# Patient Record
Sex: MALE | Race: WHITE | NOT HISPANIC OR LATINO
[De-identification: names, ages, dates, MRNs, and addresses within clinical notes are randomized per-mention and may not be internally consistent; named-entity substitution may affect disease eponyms.]

---

## 2023-01-26 ENCOUNTER — APPOINTMENT (OUTPATIENT)
Dept: GASTROENTEROLOGY | Facility: CLINIC | Age: 43
End: 2023-01-26
Payer: COMMERCIAL

## 2023-01-26 VITALS
BODY MASS INDEX: 27.57 KG/M2 | DIASTOLIC BLOOD PRESSURE: 70 MMHG | OXYGEN SATURATION: 98 % | WEIGHT: 208 LBS | SYSTOLIC BLOOD PRESSURE: 112 MMHG | HEIGHT: 73 IN | HEART RATE: 82 BPM | TEMPERATURE: 97.5 F

## 2023-01-26 PROCEDURE — 99204 OFFICE O/P NEW MOD 45 MIN: CPT

## 2023-01-26 RX ORDER — FAMOTIDINE 20 MG/1
20 TABLET, FILM COATED ORAL
Refills: 0 | Status: ACTIVE | COMMUNITY

## 2023-01-26 NOTE — PHYSICAL EXAM
[Normal] : heart rate was normal and rhythm regular, normal S1 and S2, no murmurs [Bowel Sounds] : normal bowel sounds [Abdomen Tenderness] : non-tender [No Masses] : no abdominal mass palpated [Abdomen Soft] : soft [] : no hepatosplenomegaly

## 2023-01-26 NOTE — REVIEW OF SYSTEMS
[Fever] : no fever [Chills] : no chills [As Noted in HPI] : as noted in HPI [Negative] : Respiratory [FreeTextEntry7] : Occasional noncardiac chest discomfort.

## 2023-01-26 NOTE — HISTORY OF PRESENT ILLNESS
[FreeTextEntry1] : Saw patient Jerzy Pond in the office today.  The patient is a 42-year-old male who has no history of hypertension diabetes or coronary issues.  The patient has a remote history of anxiety and is currently on no medications.  Patient exercises regularly with no shortness of breath.  Intermittently the patient notices a fluttering sensation in his epigastric to esophageal area.  There is no necessary correlation with eating or fasting. athere  is no nausea ,vomiting,  dysphagia or unexplained weight loss.  Most recently the patient notices an escalation of the symptoms and also had noted some discomfort in the left side of his chest.  There was no associated shortness of breath or cardiac symptoms and in fact the patient had 2 cardiograms one in an urgent care and 1 in the emergency room.  Patient's cardiac enzymes were negative.  The patient states his bowel movements are normal with no blood in the stool or on the toilet tissue.  The patient may have 1 to 2 cups of caffeine a day rarely has ethanol and does not smoke.  The patient was recently started on omeprazole as well as famotidine in the evening.

## 2023-01-26 NOTE — ASSESSMENT
[FreeTextEntry1] : The patient is a 42-year-old male who has intermittent episodes of retrosternal chest discomfort.  Most recently there was an escalation which necessitated to 2 emergency  visits both of which had negative cardiac outcomes.  The differential diagnosis does include visceral hypersensitivity or atypical reflux.  The patient's body habitus and lifestyle does not appear to be conducive to reflux.  The  patient is quite concerned about underlying illnesses.  There may be an anxiety related component to the patient's chest wall discomfort.  We opted to send the patient for an abdominal sonogram to rule out atypical biliary issues. The  patient will continue with acid reducing medication for 2 weeks.  If the symptoms persist or recur after cessation of treatment the patient will require an upper endoscopy.  The treatment plan was discussed and all questions were answered.

## 2023-01-30 ENCOUNTER — APPOINTMENT (OUTPATIENT)
Dept: ULTRASOUND IMAGING | Facility: CLINIC | Age: 43
End: 2023-01-30
Payer: COMMERCIAL

## 2023-01-30 PROCEDURE — 76700 US EXAM ABDOM COMPLETE: CPT

## 2023-05-15 ENCOUNTER — APPOINTMENT (OUTPATIENT)
Dept: CT IMAGING | Facility: CLINIC | Age: 43
End: 2023-05-15
Payer: COMMERCIAL

## 2023-05-15 PROCEDURE — 74176 CT ABD & PELVIS W/O CONTRAST: CPT

## 2023-05-17 ENCOUNTER — TRANSCRIPTION ENCOUNTER (OUTPATIENT)
Age: 43
End: 2023-05-17

## 2023-05-20 ENCOUNTER — APPOINTMENT (OUTPATIENT)
Dept: ULTRASOUND IMAGING | Facility: CLINIC | Age: 43
End: 2023-05-20
Payer: COMMERCIAL

## 2023-05-20 PROCEDURE — 76870 US EXAM SCROTUM: CPT

## 2023-10-05 RX ORDER — OMEPRAZOLE 40 MG/1
40 CAPSULE, DELAYED RELEASE ORAL
Qty: 30 | Refills: 5 | Status: ACTIVE | COMMUNITY
Start: 1900-01-01 | End: 1900-01-01

## 2023-11-03 ENCOUNTER — APPOINTMENT (OUTPATIENT)
Dept: GASTROENTEROLOGY | Facility: CLINIC | Age: 43
End: 2023-11-03
Payer: COMMERCIAL

## 2023-11-03 VITALS
BODY MASS INDEX: 27.17 KG/M2 | TEMPERATURE: 96.9 F | HEART RATE: 59 BPM | SYSTOLIC BLOOD PRESSURE: 110 MMHG | HEIGHT: 73 IN | WEIGHT: 205 LBS | DIASTOLIC BLOOD PRESSURE: 80 MMHG | OXYGEN SATURATION: 97 %

## 2023-11-03 PROCEDURE — 99214 OFFICE O/P EST MOD 30 MIN: CPT

## 2023-11-03 RX ORDER — AMOXICILLIN 500 MG/1
CAPSULE ORAL
Refills: 0 | Status: ACTIVE | COMMUNITY

## 2023-11-03 RX ORDER — OMEPRAZOLE 20 MG/1
20 CAPSULE, DELAYED RELEASE ORAL DAILY
Qty: 30 | Refills: 5 | Status: ACTIVE | COMMUNITY
Start: 2023-11-03 | End: 1900-01-01

## 2023-12-08 ENCOUNTER — APPOINTMENT (OUTPATIENT)
Dept: CT IMAGING | Facility: CLINIC | Age: 43
End: 2023-12-08
Payer: COMMERCIAL

## 2023-12-08 PROCEDURE — 75574 CT ANGIO HRT W/3D IMAGE: CPT

## 2024-05-20 ENCOUNTER — APPOINTMENT (OUTPATIENT)
Dept: GASTROENTEROLOGY | Facility: CLINIC | Age: 44
End: 2024-05-20
Payer: COMMERCIAL

## 2024-05-20 VITALS
HEIGHT: 73 IN | WEIGHT: 204 LBS | TEMPERATURE: 97.7 F | BODY MASS INDEX: 27.04 KG/M2 | HEART RATE: 56 BPM | SYSTOLIC BLOOD PRESSURE: 122 MMHG | DIASTOLIC BLOOD PRESSURE: 80 MMHG | OXYGEN SATURATION: 98 %

## 2024-05-20 DIAGNOSIS — K21.9 GASTRO-ESOPHAGEAL REFLUX DISEASE W/OUT ESOPHAGITIS: ICD-10-CM

## 2024-05-20 DIAGNOSIS — R10.13 EPIGASTRIC PAIN: ICD-10-CM

## 2024-05-20 DIAGNOSIS — K92.1 MELENA: ICD-10-CM

## 2024-05-20 PROCEDURE — 99214 OFFICE O/P EST MOD 30 MIN: CPT

## 2024-05-20 NOTE — PHYSICAL EXAM
[Alert] : alert [No Acute Distress] : no acute distress [Normal] : heart rate was normal and rhythm regular, normal S1 and S2, no murmurs [Bowel Sounds] : normal bowel sounds [No Masses] : no abdominal mass palpated [Abdomen Soft] : soft [] : no hepatosplenomegaly [Occult Blood] : negative occult blood [de-identified] : The sphincter tone is tight and there is an external hemorrhoid.  Scant amount stool was guaiac negative

## 2024-05-20 NOTE — ASSESSMENT
[FreeTextEntry1] :  The patient is a 44-year-old male who has a history of reflux in the past.  The patient's lifestyle is not conducive to significant reflux, and he is not requiring acid reducing medication at least for now.  There is the possibility that he may need to resume medication even on an alternate day basis.  Jerzy was told to get back to me with a progress report.  The patient's stool is guaiac negative today and there is an external hemorrhoid.  The patient was told to record whether he has a recurrence of what he thinks may be blood.  My threshold for doing a colonoscopy is quite low since he is approaching age 46, especially if blood is documented.  Jerzy will get back to me as needed.

## 2024-05-20 NOTE — REVIEW OF SYSTEMS
[Fever] : no fever [Chills] : no chills [Feeling Tired] : not feeling tired [Recent Weight Loss (___ Lbs)] : no recent weight loss [Chest Pain] : no chest pain [Palpitations] : no palpitations [SOB on Exertion] : no shortness of breath during exertion [Abdominal Pain] : no abdominal pain [Constipation] : no constipation [Diarrhea] : no diarrhea [Bloating (gassiness)] : no bloating [FreeTextEntry7] : Occasional reflux

## 2024-05-20 NOTE — HISTORY OF PRESENT ILLNESS
[FreeTextEntry1] : I saw patient Jerzy Gutierres in follow-up today.  The patient is a 44-year-old male who has no history of hypertension, diabetes or coronary issues.  The patient states his appetite is good with no dysphagia or unexplained weight loss.  The patient has 1-2 servings of caffeine a day rarely has ethanol and does not smoke.  The patient has been seen for gastroesophageal reflux issues and had been on either an H2 blocker or proton pump inhibitor on a fairly regular basis.  The patient was successful in decreasing the dosage of his proton pump inhibitor.  The patient has not been on any acid reducing medication since April and feels that his symptoms are under fairly good control.  He watches his diet closely.  The patient states that he usually has normal bowel movements but rarely notices a brown to reddish tinge in the water.  He is not sure if it is blood.  The patient does not suffer from constipation but does notice stool that is difficult to clean.  The patient has a known hemorrhoid.

## 2024-05-28 PROBLEM — K92.1 HEMATOCHEZIA: Status: ACTIVE | Noted: 2024-05-28

## 2024-05-28 RX ORDER — HYDROCORTISONE 2.5% 25 MG/G
2.5 CREAM TOPICAL
Qty: 1 | Refills: 2 | Status: ACTIVE | COMMUNITY
Start: 2024-05-28 | End: 1900-01-01

## 2024-07-26 ENCOUNTER — APPOINTMENT (OUTPATIENT)
Dept: COLORECTAL SURGERY | Facility: CLINIC | Age: 44
End: 2024-07-26
Payer: COMMERCIAL

## 2024-07-26 VITALS
DIASTOLIC BLOOD PRESSURE: 73 MMHG | HEART RATE: 60 BPM | SYSTOLIC BLOOD PRESSURE: 121 MMHG | BODY MASS INDEX: 27.17 KG/M2 | WEIGHT: 205 LBS | OXYGEN SATURATION: 97 % | HEIGHT: 73 IN | RESPIRATION RATE: 15 BRPM

## 2024-07-26 DIAGNOSIS — Z78.9 OTHER SPECIFIED HEALTH STATUS: ICD-10-CM

## 2024-07-26 DIAGNOSIS — Z83.719 FAMILY HISTORY OF COLON POLYPS, UNSPECIFIED: ICD-10-CM

## 2024-07-26 DIAGNOSIS — K60.2 ANAL FISSURE, UNSPECIFIED: ICD-10-CM

## 2024-07-26 PROCEDURE — 45300 PROCTOSIGMOIDOSCOPY DX: CPT

## 2024-07-26 PROCEDURE — 99243 OFF/OP CNSLTJ NEW/EST LOW 30: CPT | Mod: 25

## 2024-07-26 RX ORDER — DOCUSATE SODIUM 100 MG/1
100 CAPSULE ORAL
Refills: 0 | Status: ACTIVE | COMMUNITY

## 2024-07-26 RX ORDER — CHOLECALCIFEROL (VITAMIN D3) 25 MCG
TABLET ORAL
Refills: 0 | Status: ACTIVE | COMMUNITY

## 2024-07-26 RX ORDER — HYDROCORTISONE ACETATE 25 MG/1
25 SUPPOSITORY RECTAL
Qty: 1 | Refills: 2 | Status: ACTIVE | COMMUNITY
Start: 2024-07-26 | End: 1900-01-01

## 2024-07-26 RX ORDER — MULTIVITAMIN
TABLET ORAL
Refills: 0 | Status: ACTIVE | COMMUNITY

## 2024-07-27 NOTE — REVIEW OF SYSTEMS
[As Noted in HPI] : as noted in HPI [Negative] : Heme/Lymph [FreeTextEntry5] : Aortic Valve calcification

## 2024-07-27 NOTE — ASSESSMENT
[FreeTextEntry1] : Pleasant 44-year-old gentleman who presents with a chief complaint of anorectal pain and bleeding.  A few months ago the patient had a difficult bowel movement and noted discomfort and bleeding afterwards.  This improved but then recurred again a few weeks ago.  He has been taking Colace and applying a steroid cream under the guidance of his gastroenterologist.  He was scheduled for a colonoscopy within the next 2 weeks.  He does admit to change in bowel habits in the last few months characterized by sometimes more frequent smaller movements and then going a day or 2 without a movement which is totally different than his normal baseline pattern.  Inspection of the anorectal area on effacement of the anus reveals an anterior midline fissure.  On digital examination pain is elicited in the anal canal in the anterior midline.  Sigmoidoscopy to 14 cm is negative.  Upon withdrawal of the scope the patient has an obvious anterior midline fissure.  I would initially try nonoperative therapy including the advise to drink 6 glasses of water or juice a day, using a daily fiber supplement such as Metamucil, Anusol suppository nightly and nifedipine ointment twice daily.  If this is not better in 4 weeks I will re-evaluate.  Generally I would not advise a colonoscopy in the face of a fissure but with this change in bowel habits which may be related to the fissure I think it behooves us to do the colonoscopy to ensure that there is no proximal synchronous pathology present.

## 2024-07-27 NOTE — PHYSICAL EXAM
[Normal Breath Sounds] : Normal breath sounds [Normal Heart Sounds] : normal heart sounds [Normal Rate and Rhythm] : normal rate and rhythm [No Rash or Lesion] : No rash or lesion [Alert] : alert [Oriented to Person] : oriented to person [Oriented to Place] : oriented to place [Oriented to Time] : oriented to time [Anxious] : anxious [de-identified] : round, NT/ND [de-identified] : NC/AT [de-identified] : well nourished male [de-identified] : SHARATH/+ROM [de-identified] : Intact

## 2024-07-27 NOTE — PHYSICAL EXAM
[Normal Breath Sounds] : Normal breath sounds [Normal Heart Sounds] : normal heart sounds [Normal Rate and Rhythm] : normal rate and rhythm [No Rash or Lesion] : No rash or lesion [Alert] : alert [Oriented to Person] : oriented to person [Oriented to Place] : oriented to place [Oriented to Time] : oriented to time [Anxious] : anxious [de-identified] : round, NT/ND [de-identified] : NC/AT [de-identified] : well nourished male [de-identified] : SHARATH/+ROM [de-identified] : Intact

## 2024-07-27 NOTE — HISTORY OF PRESENT ILLNESS
[FreeTextEntry1] : Patient is a 43 yo male here with complaints of an anal fissure.  He states that he usually has a daily bowel movement with occasional constipation.  In May he did have a difficult bowel movement and there was blood when he wiped.  He then was continuing to have pain with his bowel movements.  He saw Dr. Doshi prior to this happening so he called him and he started him on Colace and Proctozone cream.  He states that it started to get better but then in July it started again.  He was told to start soaks which seemed to help.

## 2024-07-27 NOTE — HISTORY OF PRESENT ILLNESS
[FreeTextEntry1] : Patient is a 45 yo male here with complaints of an anal fissure.  He states that he usually has a daily bowel movement with occasional constipation.  In May he did have a difficult bowel movement and there was blood when he wiped.  He then was continuing to have pain with his bowel movements.  He saw Dr. Doshi prior to this happening so he called him and he started him on Colace and Proctozone cream.  He states that it started to get better but then in July it started again.  He was told to start soaks which seemed to help.

## 2024-08-06 ENCOUNTER — APPOINTMENT (OUTPATIENT)
Dept: GASTROENTEROLOGY | Facility: AMBULATORY MEDICAL SERVICES | Age: 44
End: 2024-08-06

## 2024-08-06 PROCEDURE — 45378 DIAGNOSTIC COLONOSCOPY: CPT

## 2024-08-14 ENCOUNTER — RX RENEWAL (OUTPATIENT)
Age: 44
End: 2024-08-14

## 2025-04-02 ENCOUNTER — APPOINTMENT (OUTPATIENT)
Dept: GASTROENTEROLOGY | Facility: CLINIC | Age: 45
End: 2025-04-02

## 2025-08-11 ENCOUNTER — APPOINTMENT (OUTPATIENT)
Dept: GASTROENTEROLOGY | Facility: CLINIC | Age: 45
End: 2025-08-11
Payer: COMMERCIAL

## 2025-08-11 VITALS
BODY MASS INDEX: 27.83 KG/M2 | DIASTOLIC BLOOD PRESSURE: 70 MMHG | SYSTOLIC BLOOD PRESSURE: 118 MMHG | WEIGHT: 210 LBS | TEMPERATURE: 97.1 F | HEIGHT: 73 IN

## 2025-08-11 DIAGNOSIS — Z83.719 FAMILY HISTORY OF COLON POLYPS, UNSPECIFIED: ICD-10-CM

## 2025-08-11 DIAGNOSIS — K21.9 GASTRO-ESOPHAGEAL REFLUX DISEASE W/OUT ESOPHAGITIS: ICD-10-CM

## 2025-08-11 DIAGNOSIS — Z78.9 OTHER SPECIFIED HEALTH STATUS: ICD-10-CM

## 2025-08-11 DIAGNOSIS — R10.13 EPIGASTRIC PAIN: ICD-10-CM

## 2025-08-11 PROCEDURE — 99214 OFFICE O/P EST MOD 30 MIN: CPT

## 2025-08-11 PROCEDURE — G2211 COMPLEX E/M VISIT ADD ON: CPT | Mod: NC

## 2025-08-19 ENCOUNTER — APPOINTMENT (OUTPATIENT)
Dept: GASTROENTEROLOGY | Facility: AMBULATORY MEDICAL SERVICES | Age: 45
End: 2025-08-19
Payer: COMMERCIAL

## 2025-08-19 PROCEDURE — 43239 EGD BIOPSY SINGLE/MULTIPLE: CPT
